# Patient Record
Sex: FEMALE | Race: WHITE | NOT HISPANIC OR LATINO | Employment: OTHER | URBAN - METROPOLITAN AREA
[De-identification: names, ages, dates, MRNs, and addresses within clinical notes are randomized per-mention and may not be internally consistent; named-entity substitution may affect disease eponyms.]

---

## 2017-08-29 ENCOUNTER — TRANSCRIBE ORDERS (OUTPATIENT)
Dept: ADMINISTRATIVE | Facility: HOSPITAL | Age: 57
End: 2017-08-29

## 2017-08-29 DIAGNOSIS — Z12.31 VISIT FOR SCREENING MAMMOGRAM: Primary | ICD-10-CM

## 2017-09-29 ENCOUNTER — APPOINTMENT (OUTPATIENT)
Dept: MAMMOGRAPHY | Facility: HOSPITAL | Age: 57
End: 2017-09-29
Attending: OBSTETRICS & GYNECOLOGY

## 2017-10-11 ENCOUNTER — HOSPITAL ENCOUNTER (OUTPATIENT)
Dept: MAMMOGRAPHY | Facility: HOSPITAL | Age: 57
Discharge: HOME | End: 2017-10-11
Attending: OBSTETRICS & GYNECOLOGY | Admitting: OBSTETRICS & GYNECOLOGY

## 2017-10-11 DIAGNOSIS — Z12.31 VISIT FOR SCREENING MAMMOGRAM: ICD-10-CM

## 2017-10-11 PROCEDURE — G0202 SCR MAMMO BI INCL CAD: HCPCS

## 2017-10-11 PROCEDURE — 77063 BREAST TOMOSYNTHESIS BI: CPT

## 2017-10-12 PROCEDURE — 77067 SCR MAMMO BI INCL CAD: CPT | Performed by: RADIOLOGY

## 2017-10-12 PROCEDURE — 77063 BREAST TOMOSYNTHESIS BI: CPT | Performed by: RADIOLOGY

## 2018-09-04 ENCOUNTER — TRANSCRIBE ORDERS (OUTPATIENT)
Dept: ADMINISTRATIVE | Facility: HOSPITAL | Age: 58
End: 2018-09-04

## 2018-09-04 DIAGNOSIS — Z12.31 VISIT FOR SCREENING MAMMOGRAM: Primary | ICD-10-CM

## 2018-10-12 ENCOUNTER — HOSPITAL ENCOUNTER (OUTPATIENT)
Dept: MAMMOGRAPHY | Facility: HOSPITAL | Age: 58
Discharge: HOME | End: 2018-10-12
Attending: OBSTETRICS & GYNECOLOGY | Admitting: OBSTETRICS & GYNECOLOGY

## 2018-10-12 DIAGNOSIS — Z12.31 VISIT FOR SCREENING MAMMOGRAM: ICD-10-CM

## 2018-10-12 PROCEDURE — 77063 BREAST TOMOSYNTHESIS BI: CPT

## 2018-10-12 PROCEDURE — 77063 BREAST TOMOSYNTHESIS BI: CPT | Performed by: RADIOLOGY

## 2018-10-12 PROCEDURE — 77067 SCR MAMMO BI INCL CAD: CPT | Performed by: RADIOLOGY

## 2018-10-12 PROCEDURE — 77067 SCR MAMMO BI INCL CAD: CPT

## 2019-09-04 ENCOUNTER — TRANSCRIBE ORDERS (OUTPATIENT)
Dept: ADMINISTRATIVE | Facility: HOSPITAL | Age: 59
End: 2019-09-04

## 2019-09-04 DIAGNOSIS — Z12.31 VISIT FOR SCREENING MAMMOGRAM: Primary | ICD-10-CM

## 2019-11-06 ENCOUNTER — HOSPITAL ENCOUNTER (OUTPATIENT)
Dept: MAMMOGRAPHY | Facility: HOSPITAL | Age: 59
Discharge: HOME | End: 2019-11-06
Admitting: OBSTETRICS & GYNECOLOGY

## 2019-11-06 DIAGNOSIS — Z12.31 VISIT FOR SCREENING MAMMOGRAM: ICD-10-CM

## 2019-11-06 PROCEDURE — 77063 BREAST TOMOSYNTHESIS BI: CPT

## 2019-11-06 PROCEDURE — 77067 SCR MAMMO BI INCL CAD: CPT | Performed by: RADIOLOGY

## 2019-11-06 PROCEDURE — 77067 SCR MAMMO BI INCL CAD: CPT

## 2019-11-06 PROCEDURE — 77063 BREAST TOMOSYNTHESIS BI: CPT | Performed by: RADIOLOGY

## 2020-09-25 ENCOUNTER — TRANSCRIBE ORDERS (OUTPATIENT)
Dept: OBSTETRICS AND GYNECOLOGY | Facility: CLINIC | Age: 60
End: 2020-09-25

## 2020-09-25 DIAGNOSIS — Z12.31 VISIT FOR SCREENING MAMMOGRAM: Primary | ICD-10-CM

## 2020-11-18 RX ORDER — CITALOPRAM 20 MG/1
TABLET ORAL
Qty: 30 TABLET | Refills: 0 | Status: SHIPPED | OUTPATIENT
Start: 2020-11-18 | End: 2020-12-11 | Stop reason: SDUPTHER

## 2020-12-11 ENCOUNTER — OFFICE VISIT (OUTPATIENT)
Dept: OBSTETRICS AND GYNECOLOGY | Facility: CLINIC | Age: 60
End: 2020-12-11

## 2020-12-11 VITALS
SYSTOLIC BLOOD PRESSURE: 150 MMHG | BODY MASS INDEX: 33.27 KG/M2 | DIASTOLIC BLOOD PRESSURE: 94 MMHG | HEIGHT: 66 IN | WEIGHT: 207 LBS

## 2020-12-11 DIAGNOSIS — Z01.419 WOMEN'S ANNUAL ROUTINE GYNECOLOGICAL EXAMINATION: Primary | ICD-10-CM

## 2020-12-11 PROBLEM — Z00.00 ANNUAL PHYSICAL EXAM: Status: ACTIVE | Noted: 2020-12-11

## 2020-12-11 PROBLEM — N95.1 MENOPAUSAL SYMPTOMS: Status: ACTIVE | Noted: 2020-12-11

## 2020-12-11 PROCEDURE — 99396 PREV VISIT EST AGE 40-64: CPT | Performed by: OBSTETRICS & GYNECOLOGY

## 2020-12-11 RX ORDER — HYDROCORTISONE ACETATE PRAMOXINE HCL 2.5; 1 G/100G; G/100G
CREAM TOPICAL 3 TIMES DAILY
Qty: 30 G | Refills: 2 | Status: SHIPPED | OUTPATIENT
Start: 2020-12-11 | End: 2023-01-03

## 2020-12-11 RX ORDER — CITALOPRAM 20 MG/1
20 TABLET ORAL DAILY
Qty: 30 TABLET | Refills: 11 | Status: SHIPPED | OUTPATIENT
Start: 2020-12-11 | End: 2021-12-03

## 2020-12-11 RX ORDER — FOLIC ACID 1 MG/1
TABLET ORAL
COMMUNITY
Start: 2020-12-07

## 2020-12-11 RX ORDER — HYDROXYCHLOROQUINE SULFATE 200 MG/1
TABLET, FILM COATED ORAL
COMMUNITY
Start: 2020-12-07 | End: 2023-01-03

## 2020-12-11 RX ORDER — METHOTREXATE 2.5 MG/1
2.5 TABLET ORAL WEEKLY
COMMUNITY

## 2020-12-11 NOTE — PROGRESS NOTES
GYN Annual Exam     CC - Here for annual exam.     Subjective   HPI  Carrie Wilson is a 60 y.o. female, , who presents for annual well woman exam.  She is postmenopausal. Her last LMP was No LMP recorded. Patient is postmenopausal..  Periods are absent because she is postmenopausal.  Patient reports problems with: none.  Partner Status: Marital Status: .    Desires STD Screening: YES/NO/Other: no.    Additional OB/GYN History   Current contraception: none, pt is postmenopausal    Last Pap :   Last Completed Pap Smear       Status Date      PAP SMEAR Done 2019 Negative        History of abnormal Pap smear: no  Family history of uterine, colon, breast, or ovarian cancer: no  Last mammogram:   Last Completed Mammogram       Status Date      MAMMOGRAM Done 2019 MAMMO SCREENING DIGITAL TOMOSYNTHESIS BILATERAL W CAD     Patient has more history with this topic...        Last colonoscopy:   Last Completed Colonoscopy       Status Date      COLONOSCOPY No completions recorded        Last DEXA: ; Normal  Exercises Regularly: no  Feelings of Anxiety or Depression: no    Tobacco Usage?: No    Health Maintenance   Topic Date Due   • COLONOSCOPY  1960   • ANNUAL PHYSICAL  08/15/1963   • TDAP/TD VACCINES (1 - Tdap) 08/15/1979   • ZOSTER VACCINE (1 of 2) 08/15/2010   • INFLUENZA VACCINE  2020   • HEPATITIS C SCREENING  10/23/2020   • Annual Gynecologic Pelvic and Breast Exam  2020   • MAMMOGRAM  2021   • PAP SMEAR  2022   • Pneumococcal Vaccine 0-64  Aged Out       Current Outpatient Medications   Medication Sig Dispense Refill   • methotrexate 2.5 MG tablet Take 2.5 mg by mouth 1 (One) Time Per Week. 4 tablets weekly     • citalopram (CeleXA) 20 MG tablet Take 1 tablet by mouth Daily. 30 tablet 11   • folic acid (FOLVITE) 1 MG tablet      • Hydrocort-Pramoxine, Perianal, (ANALPRAM-HC) 2.5-1 % rectal cream Insert  into the rectum 3 (Three) Times a Day. 30 g 2   •  "hydroxychloroquine (PLAQUENIL) 200 MG tablet        No current facility-administered medications for this visit.        The additional following portions of the patient's history were reviewed and updated as appropriate: allergies, current medications, past family history, past medical history, past social history, past surgical history and problem list.    Review of Systems   Constitutional: Negative.    HENT: Negative.    Eyes: Negative.    Respiratory: Negative.    Cardiovascular: Negative.    Gastrointestinal: Negative.    Endocrine: Negative.    Genitourinary: Negative.    Musculoskeletal: Negative.    Skin: Negative.    Allergic/Immunologic: Negative.    Neurological: Negative.    Hematological: Negative.    Psychiatric/Behavioral: Negative.        I have reviewed and agree with the HPI, ROS, and historical information as entered above. Ray Samson MD    Objective   /94   Ht 167.6 cm (66\")   Wt 93.9 kg (207 lb)   Breastfeeding No   BMI 33.41 kg/m²     PE    Breast: Without masses ,nontender, no skin changes or retractions  Axilla: Normal, no lymphadenopathy  Heart: Regular rate no murmurs rubs or gallops  Lungs: Clear to auscultation, normal breath sounds bilaterally  Abdomen: Soft nontender, no hepatosplenomegaly, no guarding or rebound, no masses  Pelvic exam  External genitalia: Normal introitus and vulva  Vagina: Normal mucosa no bleeding inflammation or discharge  Bladder: Normal position nontender  Urethral meatus and urethra: Normal nontender  Cervix: No lesions, no discharge, bleeding or inflammation  Bimanual: Nontender adnexa clear, no sign of uterine or ovarian enlargement  Anal:  external hemorrhoid noted    Rectovaginal: Patient declined      Assessment/Plan     Assessment     Problem List Items Addressed This Visit     None      Visit Diagnoses     Women's annual routine gynecological examination    -  Primary    Relevant Orders    Pap IG, Rfx HPV ASCU          1. GYN annual " well woman exam.   2. Patient would like to continue to get yearly Paps  3. Renew her Celexa  4. Prescribe primary seen hydrocortisone cream for hemorrhoid    Plan     1. Reviewed HPV guidelines and she would like to continue yearly paps regardless.   2. Reviewed monthly self breast exams.  Instructed to call with lumps, pain, or breast discharge.  Yearly mammograms ordered.  3. Anticipatory menopausal guidance given.  Healthy lifestyle changes including diet and exercise reviewed, preventative measures for health reviewed.  Ray Samson MD  12/11/2020

## 2020-12-17 DIAGNOSIS — Z01.419 WOMEN'S ANNUAL ROUTINE GYNECOLOGICAL EXAMINATION: ICD-10-CM

## 2020-12-29 ENCOUNTER — APPOINTMENT (OUTPATIENT)
Dept: MAMMOGRAPHY | Facility: HOSPITAL | Age: 60
End: 2020-12-29

## 2021-01-27 ENCOUNTER — TELEPHONE (OUTPATIENT)
Dept: OBSTETRICS AND GYNECOLOGY | Facility: CLINIC | Age: 61
End: 2021-01-27

## 2021-01-27 NOTE — TELEPHONE ENCOUNTER
Pharmacy calling for clarification on Rx. Written cream not covered. Pharmacy wanting to know if they can use alternate.

## 2021-03-09 ENCOUNTER — HOSPITAL ENCOUNTER (OUTPATIENT)
Dept: MAMMOGRAPHY | Facility: HOSPITAL | Age: 61
End: 2021-03-09

## 2021-04-26 ENCOUNTER — HOSPITAL ENCOUNTER (OUTPATIENT)
Dept: MAMMOGRAPHY | Facility: HOSPITAL | Age: 61
Discharge: HOME | End: 2021-04-26
Admitting: OBSTETRICS & GYNECOLOGY

## 2021-04-26 DIAGNOSIS — Z12.31 VISIT FOR SCREENING MAMMOGRAM: ICD-10-CM

## 2021-04-26 PROCEDURE — 77067 SCR MAMMO BI INCL CAD: CPT

## 2021-04-26 PROCEDURE — 77063 BREAST TOMOSYNTHESIS BI: CPT

## 2021-04-26 PROCEDURE — 77067 SCR MAMMO BI INCL CAD: CPT | Performed by: RADIOLOGY

## 2021-04-26 PROCEDURE — 77063 BREAST TOMOSYNTHESIS BI: CPT | Performed by: RADIOLOGY

## 2021-12-03 RX ORDER — CITALOPRAM 20 MG/1
TABLET ORAL
Qty: 30 TABLET | Refills: 0 | Status: SHIPPED | OUTPATIENT
Start: 2021-12-03 | End: 2021-12-22 | Stop reason: SDUPTHER

## 2021-12-22 ENCOUNTER — OFFICE VISIT (OUTPATIENT)
Dept: OBSTETRICS AND GYNECOLOGY | Facility: CLINIC | Age: 61
End: 2021-12-22

## 2021-12-22 VITALS
BODY MASS INDEX: 29.41 KG/M2 | SYSTOLIC BLOOD PRESSURE: 120 MMHG | HEIGHT: 66 IN | DIASTOLIC BLOOD PRESSURE: 72 MMHG | WEIGHT: 183 LBS

## 2021-12-22 DIAGNOSIS — N95.1 MENOPAUSAL SYMPTOMS: ICD-10-CM

## 2021-12-22 DIAGNOSIS — Z01.419 PAP TEST, AS PART OF ROUTINE GYNECOLOGICAL EXAMINATION: Primary | ICD-10-CM

## 2021-12-22 PROCEDURE — 99396 PREV VISIT EST AGE 40-64: CPT | Performed by: OBSTETRICS & GYNECOLOGY

## 2021-12-22 RX ORDER — HYDROXYCHLOROQUINE SULFATE 200 MG/1
TABLET, FILM COATED ORAL 2 TIMES DAILY
COMMUNITY

## 2021-12-22 RX ORDER — CALCIUM ACETATE 667 MG/1
TABLET ORAL
COMMUNITY

## 2021-12-22 RX ORDER — CITALOPRAM 20 MG/1
20 TABLET ORAL DAILY
Qty: 30 TABLET | Refills: 11 | Status: SHIPPED | OUTPATIENT
Start: 2021-12-22 | End: 2023-01-03 | Stop reason: SDUPTHER

## 2021-12-22 NOTE — PROGRESS NOTES
GYN Annual Exam     CC - Here for annual exam.   Pt wants yearly paps  Pap last year Pap HPV if ASCUS     Subjective   HPI  Carrie Wilson is a 61 y.o. female, , who presents for annual well woman exam.  She is postmenopausal.  No LMP recorded. Patient is postmenopausal.  Patient reports problems with: none.  Partner Status: Marital Status: .    Desires STD Screening:  no.    Additional OB/GYN History   Current contraception: menopausal     Last Pap :   Last Completed Pap Smear          Ordered - PAP SMEAR (Every 3 Years) Ordered on 2021  Pap IG, Rfx HPV ASCU    2019  Done - Negative              History of abnormal Pap smear: no  Family history of uterine, colon, breast, or ovarian cancer: no  Last mammogram:   Last Completed Mammogram          MAMMOGRAM (Yearly) Next due on 2021  Mammo Screening Digital Tomosynthesis Bilateral With CAD    2019  Mammo Screening Digital Tomosynthesis Bilateral With CAD    10/12/2018  Mammo Screening Digital Tomosynthesis Bilateral With CAD    10/11/2017  Mammo Screening Digital Tomosynthesis Bilateral With CAD    2016  Mammo Screening Digital Tomosynthesis Bilateral With CAD    Only the first 5 history entries have been loaded, but more history exists.              Last colonoscopy: Dr. High  normal - pt has IBS D  Last Completed Colonoscopy     This patient has no relevant Health Maintenance data.        Last DEXA:  normal with PCP hx osteopenia off meds  Exercises Regularly: yes  Feelings of Anxiety or Depression: no    Tobacco Usage?: no     Health Maintenance   Topic Date Due   • COLORECTAL CANCER SCREENING  Never done   • ANNUAL PHYSICAL  Never done   • TDAP/TD VACCINES (1 - Tdap) Never done   • ZOSTER VACCINE (1 of 2) Never done   • HEPATITIS C SCREENING  Never done   • COVID-19 Vaccine (2 - Booster for Billy series) 2021   • Annual Gynecologic Pelvic and Breast Exam  2021   •  "MAMMOGRAM  04/26/2022   • PAP SMEAR  12/11/2023   • INFLUENZA VACCINE  Completed   • Pneumococcal Vaccine 0-64  Aged Out       Current Outpatient Medications   Medication Sig Dispense Refill   • Calcium Acetate 667 MG tablet      • citalopram (CeleXA) 20 MG tablet Take 1 tablet by mouth Daily. 30 tablet 11   • folic acid (FOLVITE) 1 MG tablet      • Hydrocort-Pramoxine, Perianal, (ANALPRAM-HC) 2.5-1 % rectal cream Insert  into the rectum 3 (Three) Times a Day. 30 g 2   • hydroxychloroquine (PLAQUENIL) 200 MG tablet      • methotrexate 2.5 MG tablet Take 2.5 mg by mouth 1 (One) Time Per Week. 4 tablets weekly     • hydroxychloroquine (PLAQUENIL) 200 MG tablet Daily.     • methotrexate (TREXALL) 5 MG tablet 1 tab(s)       No current facility-administered medications for this visit.       The additional following portions of the patient's history were reviewed and updated as appropriate: allergies, past family history, past medical history, past social history, past surgical history and problem list.    Review of Systems   Constitutional: Negative.    HENT: Negative.    Eyes: Negative.    Respiratory: Negative.    Cardiovascular: Negative.    Gastrointestinal: Positive for diarrhea (pt has IBS D).   Endocrine: Negative.    Genitourinary: Negative.    Musculoskeletal: Negative.    Skin: Negative.    Allergic/Immunologic: Negative.    Neurological: Negative.    Hematological: Negative.    Psychiatric/Behavioral: Negative.        I have reviewed and agree with the HPI, ROS, and historical information as entered above. Ray Samson MD    Objective   /72   Ht 167.6 cm (66\")   Wt 83 kg (183 lb)   BMI 29.54 kg/m²     PE    Breast: Without masses ,nontender, no skin changes or retractions  Axilla: Normal, no lymphadenopathy  Heart: Regular rate no murmurs rubs or gallops  Lungs: Clear to auscultation, normal breath sounds bilaterally  Abdomen: Soft nontender, no hepatosplenomegaly, no guarding or rebound, no " masses  Pelvic exam  External genitalia: Normal introitus and vulva  Vagina: Normal mucosa no bleeding inflammation or discharge  Bladder: Normal position nontender  Urethral meatus and urethra: Normal nontender  Cervix: No lesions, no discharge, bleeding or inflammation  Bimanual: Nontender adnexa clear, no sign of uterine or ovarian enlargement      Assessment/Plan     Assessment     Problem List Items Addressed This Visit        Genitourinary and Reproductive     Menopausal symptoms    Pap test, as part of routine gynecological examination - Primary    Relevant Orders    Pap IG, Rfx HPV ASCU          1. GYN annual well woman exam.   2. Normal exam    Plan     1. Next pap due in: 3 years; patient wants to continue yearly  2. Reviewed HPV guidelines and she would like to continue yearly paps regardless.   3. Anticipatory menopausal guidance given.  Preventative health information reviewed  Ray Samson MD  12/22/2021

## 2022-01-04 DIAGNOSIS — Z01.419 PAP TEST, AS PART OF ROUTINE GYNECOLOGICAL EXAMINATION: ICD-10-CM

## 2022-03-22 ENCOUNTER — TRANSCRIBE ORDERS (OUTPATIENT)
Dept: ADMINISTRATIVE | Facility: HOSPITAL | Age: 62
End: 2022-03-22

## 2022-03-22 DIAGNOSIS — Z12.31 VISIT FOR SCREENING MAMMOGRAM: Primary | ICD-10-CM

## 2022-05-09 ENCOUNTER — APPOINTMENT (OUTPATIENT)
Dept: MAMMOGRAPHY | Facility: HOSPITAL | Age: 62
End: 2022-05-09

## 2022-06-13 ENCOUNTER — HOSPITAL ENCOUNTER (OUTPATIENT)
Dept: MAMMOGRAPHY | Facility: HOSPITAL | Age: 62
Discharge: HOME | End: 2022-06-13
Admitting: NURSE PRACTITIONER

## 2022-06-13 DIAGNOSIS — Z12.31 VISIT FOR SCREENING MAMMOGRAM: ICD-10-CM

## 2022-06-13 PROCEDURE — 77063 BREAST TOMOSYNTHESIS BI: CPT

## 2022-06-13 PROCEDURE — 77063 BREAST TOMOSYNTHESIS BI: CPT | Performed by: RADIOLOGY

## 2022-06-13 PROCEDURE — 77067 SCR MAMMO BI INCL CAD: CPT | Performed by: RADIOLOGY

## 2022-06-13 PROCEDURE — 77067 SCR MAMMO BI INCL CAD: CPT

## 2023-01-03 ENCOUNTER — OFFICE VISIT (OUTPATIENT)
Dept: OBSTETRICS AND GYNECOLOGY | Facility: CLINIC | Age: 63
End: 2023-01-03
Payer: COMMERCIAL

## 2023-01-03 VITALS
SYSTOLIC BLOOD PRESSURE: 112 MMHG | HEIGHT: 66 IN | WEIGHT: 194 LBS | BODY MASS INDEX: 31.18 KG/M2 | DIASTOLIC BLOOD PRESSURE: 82 MMHG

## 2023-01-03 DIAGNOSIS — Z78.0 POSTMENOPAUSAL STATUS: ICD-10-CM

## 2023-01-03 DIAGNOSIS — Z12.39 ENCOUNTER FOR BREAST CANCER SCREENING USING NON-MAMMOGRAM MODALITY: ICD-10-CM

## 2023-01-03 DIAGNOSIS — M85.80 OSTEOPENIA AFTER MENOPAUSE: ICD-10-CM

## 2023-01-03 DIAGNOSIS — Z78.0 OSTEOPENIA AFTER MENOPAUSE: ICD-10-CM

## 2023-01-03 DIAGNOSIS — N95.1 MENOPAUSAL SYMPTOMS: ICD-10-CM

## 2023-01-03 DIAGNOSIS — Z01.419 WOMEN'S ANNUAL ROUTINE GYNECOLOGICAL EXAMINATION: Primary | ICD-10-CM

## 2023-01-03 PROCEDURE — 99396 PREV VISIT EST AGE 40-64: CPT | Performed by: OBSTETRICS & GYNECOLOGY

## 2023-01-03 RX ORDER — FINASTERIDE 5 MG/1
TABLET, FILM COATED ORAL
COMMUNITY
Start: 2022-11-21

## 2023-01-03 RX ORDER — FLUTICASONE PROPIONATE 50 MCG
SPRAY, SUSPENSION (ML) NASAL EVERY 24 HOURS
COMMUNITY

## 2023-01-03 RX ORDER — CITALOPRAM 20 MG/1
20 TABLET ORAL DAILY
Qty: 90 TABLET | Refills: 3 | Status: SHIPPED | OUTPATIENT
Start: 2023-01-03

## 2023-01-03 RX ORDER — MELATONIN
1000 DAILY
COMMUNITY

## 2023-01-03 RX ORDER — MINOXIDIL 2.5 MG/1
TABLET ORAL
COMMUNITY
Start: 2022-11-21

## 2023-01-03 NOTE — PROGRESS NOTES
Gynecologic Annual Exam Note        GYN Annual Exam     CC - Here for annual exam.        DELIA  Carrie Wilson is a 62 y.o. female, , who presents for annual well woman exam as a established patient of the practice who is new to me. She is postmenopausal. Denies vaginal bleeding. Patient reports problems with: urinary frequency. Patient states that this has been present since menopause. Patient reports drinking large amounts of water daily. There were no changes to her medical or surgical history since her last visit. Partner Status: Marital Status: .  She is is not currently sexually active. STD testing recommendations have been explained to the patient and she does not desire STD testing.    She usually gets up zero to one time at time at night.    Additional OB/GYN History   On HRT? No    Last Pap : 2021. Results: negative. HPV: not done  Last Completed Pap Smear          Ordered - PAP SMEAR (Every 3 Years) Ordered on 1/3/2023    2021  SCANNED - PAP SMEAR    2020  Pap IG, Rfx HPV ASCU    2019  Done - Negative              History of abnormal Pap smear: no  Family history of uterine, colon, breast, or ovarian cancer: no  Performs monthly Self-Breast Exam: no  Last mammogram: 2022. Done at .  Last Completed Mammogram          Ordered - MAMMOGRAM (Yearly) Ordered on 1/3/2023    2022  Mammo Screening Digital Tomosynthesis Bilateral With CAD    2021  Mammo Screening Digital Tomosynthesis Bilateral With CAD    2019  Mammo Screening Digital Tomosynthesis Bilateral With CAD    10/12/2018  Mammo Screening Digital Tomosynthesis Bilateral With CAD    10/11/2017  Mammo Screening Digital Tomosynthesis Bilateral With CAD    Only the first 5 history entries have been loaded, but more history exists.              Last colonoscopy: has had a colonoscopy 4 year(s) ago.  Last Completed Colonoscopy     This patient has no relevant Health Maintenance data.             Last bone density scan (DEXA): On 2029 and results were Osteopenia.   Exercises Regularly: yes  Feelings of Anxiety or Depression: no      Tobacco Usage?: No       Current Outpatient Medications:   •  Calcium Acetate 667 MG tablet, , Disp: , Rfl:   •  cholecalciferol (VITAMIN D3) 25 MCG (1000 UT) tablet, Take 1,000 Units by mouth Daily., Disp: , Rfl:   •  citalopram (CeleXA) 20 MG tablet, Take 1 tablet by mouth Daily., Disp: 90 tablet, Rfl: 3  •  finasteride (PROSCAR) 5 MG tablet, , Disp: , Rfl:   •  fluticasone (FLONASE) 50 MCG/ACT nasal spray, Daily., Disp: , Rfl:   •  folic acid (FOLVITE) 1 MG tablet, , Disp: , Rfl:   •  hydroxychloroquine (PLAQUENIL) 200 MG tablet, 2 (Two) Times a Day., Disp: , Rfl:   •  methotrexate 2.5 MG tablet, Take 2.5 mg by mouth 1 (One) Time Per Week. 3 tablets weekly, Disp: , Rfl:   •  minoxidil (LONITEN) 2.5 MG tablet, , Disp: , Rfl:   •  tretinoin (RETIN-A) 0.025 % cream, , Disp: , Rfl:     Patient is requesting refills of Celexa.    OB History        2    Para   2    Term   2            AB        Living   2       SAB        IAB        Ectopic        Molar        Multiple        Live Births   2                Past Medical History:   Diagnosis Date   • History of bone density study 02/10/2017    2019-enia--on fosamax   • Hx of colonoscopy 2017    normal--May pt had diarrhea--tests normal    • IBS (irritable bowel syndrome)    • Osteopenia    • Papanicolaou smear 10/16/2018    2019-neg   • Screening breast examination     Self; Admits   • Varicella child        Past Surgical History:   Procedure Laterality Date   • COLONOSCOPY  2017-neg   • CYST REMOVAL      from chest   • ENDOMETRIAL BIOPSY  2011    benign   • TOE SURGERY     • WISDOM TOOTH EXTRACTION         Health Maintenance   Topic Date Due   • DXA SCAN  Never done   • COLORECTAL CANCER SCREENING  Never done   • TDAP/TD VACCINES (1 - Tdap) Never done   •  HEPATITIS C SCREENING  Never done   • ANNUAL PHYSICAL  Never done   • INFLUENZA VACCINE  08/01/2022   • Annual Gynecologic Pelvic and Breast Exam  12/23/2022   • ZOSTER VACCINE (2 of 2) 01/02/2023   • MAMMOGRAM  06/13/2023   • PAP SMEAR  12/22/2024   • COVID-19 Vaccine  Completed   • Pneumococcal Vaccine 0-64  Aged Out       The additional following portions of the patient's history were reviewed and updated as appropriate: allergies, current medications, past family history, past medical history, past social history, past surgical history and problem list.    Review of Systems   Constitutional: Negative.    HENT: Negative.    Eyes: Negative.    Respiratory: Negative.    Cardiovascular: Negative.    Gastrointestinal: Negative.    Endocrine: Negative.    Genitourinary: Positive for frequency.   Musculoskeletal: Negative.    Skin: Negative.    Allergic/Immunologic: Negative.    Neurological: Negative.    Hematological: Negative.    Psychiatric/Behavioral: Negative.        I have reviewed and agree with the HPI, ROS, and historical information as entered above. Yudelka Roman MD    Objective   /82   Ht 167.6 cm (66\")   Wt 88 kg (194 lb)   BMI 31.31 kg/m²     Physical Exam  Vitals and nursing note reviewed. Exam conducted with a chaperone present.   Constitutional:       Appearance: She is well-developed.   HENT:      Head: Normocephalic and atraumatic.   Neck:      Thyroid: No thyroid mass or thyromegaly.   Cardiovascular:      Rate and Rhythm: Normal rate and regular rhythm.      Heart sounds: No murmur heard.  Pulmonary:      Effort: Pulmonary effort is normal. No retractions.      Breath sounds: Normal breath sounds. No wheezing, rhonchi or rales.   Chest:      Chest wall: No mass or tenderness.   Breasts:     Right: Normal. No mass, nipple discharge, skin change or tenderness.      Left: Normal. No mass, nipple discharge, skin change or tenderness.   Abdominal:      General: Bowel sounds are normal.       Palpations: Abdomen is soft. Abdomen is not rigid. There is no mass.      Tenderness: There is no abdominal tenderness. There is no guarding.      Hernia: No hernia is present. There is no hernia in the left inguinal area.   Genitourinary:     Labia:         Right: No rash, tenderness or lesion.         Left: No rash, tenderness or lesion.       Vagina: Normal. No vaginal discharge or lesions.      Cervix: No cervical motion tenderness, discharge, lesion or cervical bleeding.      Uterus: Normal. Not enlarged, not fixed and not tender.       Adnexa:         Right: No mass or tenderness.          Left: No mass or tenderness.        Rectum: No external hemorrhoid.   Musculoskeletal:      Cervical back: Normal range of motion. No muscular tenderness.   Neurological:      Mental Status: She is alert and oriented to person, place, and time.   Psychiatric:         Behavior: Behavior normal.            Assessment and Plan    Problem List Items Addressed This Visit        Genitourinary and Reproductive     Menopausal symptoms    Overview     Controlled with Celexa            Musculoskeletal and Injuries    Osteopenia after menopause    Overview     Followed by Dr. Christiane Hernandez, Rhuematologist        Other Visit Diagnoses     Women's annual routine gynecological examination    -  Primary    Relevant Orders    LIQUID-BASED PAP SMEAR, [facility] ([facility])    Encounter for breast cancer screening using non-mammogram modality        Relevant Orders    Mammo Screening Digital Tomosynthesis Bilateral With CAD    Postmenopausal status              1. GYN annual well woman exam.   2. Recommended use of Vitamin D replacement and getting adequate calcium in her diet. (1500mg)  3. Reviewed monthly self breast exams.  Instructed to call with lumps, pain, or breast discharge.    4. Continue yearly mammography  5. Reviewed HPV guidelines.  6. Reviewed exercise as a preventative health measures.   7. She participates in the [employer] [facility]  cancer research program  8. Urinary frequency - no intervention needed at this time.  9. Return in about 1 year (around 1/3/2024).     Yudelka Roman MD  01/03/2023

## 2023-01-05 LAB — REF LAB TEST METHOD: NORMAL

## 2023-06-14 ENCOUNTER — HOSPITAL ENCOUNTER (OUTPATIENT)
Dept: MAMMOGRAPHY | Facility: HOSPITAL | Age: 63
Discharge: HOME | End: 2023-06-14
Admitting: OBSTETRICS & GYNECOLOGY
Payer: COMMERCIAL

## 2023-06-14 DIAGNOSIS — Z12.39 ENCOUNTER FOR BREAST CANCER SCREENING USING NON-MAMMOGRAM MODALITY: ICD-10-CM

## 2023-06-14 PROCEDURE — 77067 SCR MAMMO BI INCL CAD: CPT

## 2023-06-14 PROCEDURE — 77063 BREAST TOMOSYNTHESIS BI: CPT

## 2023-12-26 RX ORDER — CITALOPRAM 20 MG/1
20 TABLET ORAL DAILY
Qty: 90 TABLET | Refills: 0 | Status: SHIPPED | OUTPATIENT
Start: 2023-12-26

## 2024-03-22 ENCOUNTER — OFFICE VISIT (OUTPATIENT)
Dept: OBSTETRICS AND GYNECOLOGY | Facility: CLINIC | Age: 64
End: 2024-03-22
Payer: COMMERCIAL

## 2024-03-22 VITALS
SYSTOLIC BLOOD PRESSURE: 138 MMHG | DIASTOLIC BLOOD PRESSURE: 86 MMHG | HEIGHT: 66 IN | WEIGHT: 201 LBS | BODY MASS INDEX: 32.3 KG/M2

## 2024-03-22 DIAGNOSIS — Z78.0 OSTEOPENIA AFTER MENOPAUSE: ICD-10-CM

## 2024-03-22 DIAGNOSIS — N95.1 MENOPAUSAL SYMPTOMS: ICD-10-CM

## 2024-03-22 DIAGNOSIS — Z01.419 WOMEN'S ANNUAL ROUTINE GYNECOLOGICAL EXAMINATION: Primary | ICD-10-CM

## 2024-03-22 DIAGNOSIS — Z12.31 ENCOUNTER FOR SCREENING MAMMOGRAM FOR MALIGNANT NEOPLASM OF BREAST: ICD-10-CM

## 2024-03-22 DIAGNOSIS — M85.80 OSTEOPENIA AFTER MENOPAUSE: ICD-10-CM

## 2024-03-22 RX ORDER — BETAMETHASONE DIPROPIONATE 0.05 %
OINTMENT (GRAM) TOPICAL EVERY 24 HOURS
COMMUNITY

## 2024-03-22 RX ORDER — AZELASTINE HCL 205.5 UG/1
SPRAY NASAL
COMMUNITY

## 2024-03-22 RX ORDER — CITALOPRAM 20 MG/1
20 TABLET ORAL DAILY
Qty: 90 TABLET | Refills: 4 | Status: SHIPPED | OUTPATIENT
Start: 2024-03-22

## 2024-03-22 RX ORDER — TACROLIMUS 1 MG/G
OINTMENT TOPICAL
COMMUNITY
Start: 2024-01-29

## 2024-03-22 NOTE — PROGRESS NOTES
Gynecologic Annual Exam Note        GYN Annual Exam     CC - Here for annual exam.        HPI  Carrie Wilson is a 63 y.o. female, , who presents for annual well woman exam as a established patient.  She is postmenopausal.. Denies vaginal bleeding.   There were no changes to her medical or surgical history since her last visit. Marital Status: .  She is not currently sexually active. STD testing recommendations have been explained to the patient and she declines STD testing.    The patient would like to discuss the following complaints today: none    Additional OB/GYN History   On HRT? No    Last Pap : 1/3/2023. Results: negative. HPV: negative.   Last Completed Pap Smear            PAP SMEAR (Every 3 Years) Next due on 1/3/2026      2023  LIQUID-BASED PAP SMEAR, [facility] ([facility])    2021  SCANNED - PAP SMEAR    2020  Pap IG, Rfx HPV ASCU    2019  Done - Negative                  History of abnormal Pap smear: no  Family history of uterine, colon, breast, or ovarian cancer: no  Performs monthly Self-Breast Exam: no  Last mammogram: 2023. Done at .    Last Completed Mammogram       This patient has no relevant Health Maintenance data.          Last colonoscopy: has had a colonoscopy 5 years ago    Last Completed Colonoscopy       This patient has no relevant Health Maintenance data.            Her last bone density scan was 5 months ago and results were Osteopenia  Exercises Regularly: yes  Feelings of Anxiety or Depression: no      Tobacco Usage?: No       Current Outpatient Medications:     azelastine (ASTEPRO) 0.15 % solution nasal spray, spray 2 spray by intranasal route 2 times every day in each nostril, Disp: , Rfl:     betamethasone dipropionate (DIPROLENE) 0.05 % ointment, Apply  topically to the appropriate area as directed Daily., Disp: , Rfl:     Calcium Acetate 667 MG tablet, , Disp: , Rfl:     cholecalciferol (VITAMIN D3) 25 MCG (1000 UT) tablet,  Take 1 tablet by mouth Daily., Disp: , Rfl:     citalopram (CeleXA) 20 MG tablet, Take 1 tablet by mouth Daily., Disp: 90 tablet, Rfl: 4    finasteride (PROSCAR) 5 MG tablet, , Disp: , Rfl:     folic acid (FOLVITE) 1 MG tablet, , Disp: , Rfl:     hydroxychloroquine (PLAQUENIL) 200 MG tablet, 2 (Two) Times a Day., Disp: , Rfl:     methotrexate 2.5 MG tablet, Take 1 tablet by mouth 1 (One) Time Per Week. 3 tablets weekly, Disp: , Rfl:     minoxidil (LONITEN) 2.5 MG tablet, , Disp: , Rfl:     tacrolimus (PROTOPIC) 0.1 % ointment, , Disp: , Rfl:     tretinoin (RETIN-A) 0.025 % cream, , Disp: , Rfl:     Patient denies the need for medication refills today.    OB History          2    Para   2    Term   2            AB        Living   2         SAB        IAB        Ectopic        Molar        Multiple        Live Births   2                Past Medical History:   Diagnosis Date    History of bone density study 02/10/2017    2019-enia--on fosamax    Hx of colonoscopy 2017    normal--May pt had diarrhea--tests normal     IBS (irritable bowel syndrome)     Osteopenia     Papanicolaou smear 10/16/2018    2019-neg    Screening breast examination     Self; Admits    Varicella child        Past Surgical History:   Procedure Laterality Date    COLONOSCOPY  2017-neg    CYST REMOVAL      from chest    ENDOMETRIAL BIOPSY  2011    benign    TOE SURGERY      WISDOM TOOTH EXTRACTION         Health Maintenance   Topic Date Due    BMI FOLLOWUP  Never done    COLORECTAL CANCER SCREENING  Never done    TDAP/TD VACCINES (1 - Tdap) Never done    RSV Vaccine - Adults (1 - 1-dose 60+ series) Never done    HEPATITIS C SCREENING  Never done    ANNUAL PHYSICAL  Never done    Annual Gynecologic Pelvic and Breast Exam  2024    MAMMOGRAM  2024    DXA SCAN  10/06/2025    PAP SMEAR  2026    COVID-19 Vaccine  Completed    INFLUENZA VACCINE  Completed    ZOSTER VACCINE   "Completed    Pneumococcal Vaccine 0-64  Aged Out       The additional following portions of the patient's history were reviewed and updated as appropriate: allergies, current medications, past family history, past medical history, past social history, past surgical history, and problem list.    Review of Systems   Respiratory: Negative.  Negative for shortness of breath.    Cardiovascular: Negative.  Negative for chest pain.   Gastrointestinal: Negative.  Negative for abdominal distention, abdominal pain and constipation.   Endocrine: Positive for heat intolerance.        Managed with Celexa   Genitourinary: Negative.  Negative for breast discharge, breast lump, breast pain, dysuria, pelvic pain, pelvic pressure, urinary incontinence, vaginal bleeding, vaginal discharge and vaginal pain.   Psychiatric/Behavioral: Negative.  Negative for depressed mood. The patient is not nervous/anxious.        I have reviewed and agree with the HPI, ROS, and historical information as entered above. Hermelinda Romero, APRN      Objective   /86   Ht 167.6 cm (66\")   Wt 91.2 kg (201 lb)   BMI 32.44 kg/m²     Physical Exam  Vitals and nursing note reviewed. Exam conducted with a chaperone present.   Constitutional:       Appearance: Normal appearance. She is well-developed. She is not ill-appearing.   HENT:      Head: Normocephalic and atraumatic.   Neck:      Thyroid: No thyroid mass, thyromegaly or thyroid tenderness.   Cardiovascular:      Rate and Rhythm: Normal rate and regular rhythm.      Heart sounds: Normal heart sounds. No murmur heard.  Pulmonary:      Effort: Pulmonary effort is normal. No retractions.      Breath sounds: Normal breath sounds. No wheezing, rhonchi or rales.   Chest:      Chest wall: No mass or tenderness.   Breasts:     Breasts are symmetrical.      Right: Normal. No mass, nipple discharge, skin change or tenderness.      Left: Normal. No mass, nipple discharge, skin change or tenderness. "   Abdominal:      Palpations: Abdomen is soft. Abdomen is not rigid. There is no mass.      Tenderness: There is no abdominal tenderness. There is no guarding or rebound.      Hernia: No hernia is present. There is no hernia in the left inguinal area or right inguinal area.   Genitourinary:     General: Normal vulva.      Labia:         Right: No rash, tenderness or lesion.         Left: No rash, tenderness or lesion.       Vagina: Normal. No vaginal discharge, erythema, tenderness, bleeding or lesions.      Cervix: No cervical motion tenderness, discharge, friability, lesion, erythema or cervical bleeding.      Uterus: Normal. Not enlarged, not fixed and not tender.       Adnexa: Right adnexa normal and left adnexa normal.        Right: No mass or tenderness.          Left: No mass or tenderness.        Rectum: No external hemorrhoid.   Musculoskeletal:      Cervical back: Normal range of motion. No muscular tenderness.   Lymphadenopathy:      Upper Body:      Right upper body: No supraclavicular or axillary adenopathy.      Left upper body: No supraclavicular or axillary adenopathy.   Neurological:      Mental Status: She is alert and oriented to person, place, and time.   Psychiatric:         Mood and Affect: Mood normal.         Behavior: Behavior normal.            Assessment and Plan    Problem List Items Addressed This Visit       Menopausal symptoms    Overview     Controlled with Celexa         Relevant Medications    citalopram (CeleXA) 20 MG tablet    Osteopenia after menopause    Overview     Followed by Dr. Christiane Hernandez, Rhuematologist          Other Visit Diagnoses       Women's annual routine gynecological examination    -  Primary    Encounter for screening mammogram for malignant neoplasm of breast        Relevant Orders    Mammo Screening Digital Tomosynthesis Bilateral With CAD            GYN annual well woman exam.   Pap guidelines reviewed.  No Pap.  Reviewed monthly self breast exams.  Instructed  to call with lumps, pain, or breast discharge.  Advised annual mammograms.  Ordered mammogram today.  Recommended use of Vitamin D and getting adequate calcium in her diet. (1500mg).  Healthy bone information given to patient.  Osteoporosis screening ordered today.  Patient reports rheumatologist orders.  Reviewed exercise as a preventative health measures.   Colonoscopy follow-up as recommended.  Reviewed [facility] ovarian cancer screening program.  Information given.  Recommended Flu Vaccine in Fall of each year.  Instructed on [place] exercises and gave patient educational information.  Return in about 1 year (around 3/22/2025) for Annual physical or sooner if needed.         Hermelinda Romero, APRN  03/22/2024

## 2024-06-11 RX ORDER — METHOTREXATE 2.5 MG/1
TABLET ORAL
Qty: 18 TABLET | Refills: 0 | Status: SHIPPED | OUTPATIENT
Start: 2024-06-11

## 2024-06-14 RX ORDER — METHOTREXATE 2.5 MG/1
TABLET ORAL
Qty: 18 TABLET | Refills: 0 | OUTPATIENT
Start: 2024-06-14

## 2024-06-17 RX ORDER — HYDROXYCHLOROQUINE SULFATE 200 MG/1
200 TABLET, FILM COATED ORAL 2 TIMES DAILY
Qty: 60 TABLET | Refills: 2 | Status: SHIPPED | OUTPATIENT
Start: 2024-06-17

## 2024-06-28 LAB
NCCN CRITERIA FLAG: NORMAL
TYRER CUZICK SCORE: 8.8

## 2024-07-01 ENCOUNTER — HOSPITAL ENCOUNTER (OUTPATIENT)
Dept: MAMMOGRAPHY | Facility: HOSPITAL | Age: 64
Discharge: HOME | End: 2024-07-01
Payer: COMMERCIAL

## 2024-07-01 DIAGNOSIS — Z12.31 ENCOUNTER FOR SCREENING MAMMOGRAM FOR MALIGNANT NEOPLASM OF BREAST: ICD-10-CM

## 2024-07-01 PROCEDURE — 77063 BREAST TOMOSYNTHESIS BI: CPT

## 2024-07-01 PROCEDURE — 77067 SCR MAMMO BI INCL CAD: CPT

## 2024-08-26 RX ORDER — HYDROXYCHLOROQUINE SULFATE 200 MG/1
200 TABLET, FILM COATED ORAL 2 TIMES DAILY
Qty: 60 TABLET | Refills: 0 | Status: SHIPPED | OUTPATIENT
Start: 2024-08-26

## 2024-08-26 RX ORDER — METHOTREXATE 2.5 MG/1
7.5 TABLET ORAL WEEKLY
Qty: 12 TABLET | Refills: 0 | Status: SHIPPED | OUTPATIENT
Start: 2024-08-26

## 2024-08-26 NOTE — TELEPHONE ENCOUNTER
Patient called stating her appointment with [facility] was moved from 7/23 to 9/13 so she is about to run out of methotrexate and plaquenil. Labs 6/26 and patient states she plans to get them again this week. Rx sent.

## 2024-09-05 PROBLEM — M25.50 JOINT PAIN: Status: ACTIVE | Noted: 2024-09-05

## 2024-09-13 ENCOUNTER — OFFICE VISIT (OUTPATIENT)
Age: 64
End: 2024-09-13
Payer: COMMERCIAL

## 2024-09-13 VITALS
HEIGHT: 66 IN | WEIGHT: 205.3 LBS | BODY MASS INDEX: 32.99 KG/M2 | HEART RATE: 70 BPM | SYSTOLIC BLOOD PRESSURE: 122 MMHG | TEMPERATURE: 97.2 F | DIASTOLIC BLOOD PRESSURE: 76 MMHG

## 2024-09-13 DIAGNOSIS — M05.79 RHEUMATOID ARTHRITIS INVOLVING MULTIPLE SITES WITH POSITIVE RHEUMATOID FACTOR: Primary | ICD-10-CM

## 2024-09-13 DIAGNOSIS — Z79.899 HIGH RISK MEDICATION USE: ICD-10-CM

## 2024-09-13 DIAGNOSIS — E55.9 VITAMIN D DEFICIENCY: ICD-10-CM

## 2024-09-13 RX ORDER — HYDROXYCHLOROQUINE SULFATE 200 MG/1
200 TABLET, FILM COATED ORAL 2 TIMES DAILY
Qty: 60 TABLET | Refills: 4 | Status: SHIPPED | OUTPATIENT
Start: 2024-09-13

## 2024-09-13 RX ORDER — FOLIC ACID 1 MG/1
1 TABLET ORAL DAILY
Qty: 90 TABLET | Refills: 1 | Status: SHIPPED | OUTPATIENT
Start: 2024-09-13 | End: 2025-02-26 | Stop reason: SDUPTHER

## 2024-09-13 RX ORDER — CHOLECALCIFEROL (VITAMIN D3) 25 MCG
4000 TABLET ORAL DAILY
Status: SHIPPED | COMMUNITY
Start: 2024-09-13

## 2024-09-13 RX ORDER — METHOTREXATE 2.5 MG/1
7.5 TABLET ORAL WEEKLY
Qty: 12 TABLET | Refills: 4 | Status: SHIPPED | OUTPATIENT
Start: 2024-09-13 | End: 2025-02-06

## 2024-09-13 NOTE — PROGRESS NOTES
[facility] Office Follow Up Visit     Office Follow Up      Date: 09/13/2024   Patient Name: Carrie Wilson  MRN: [id number]  YOB: 1960    Referring Physician: Venus Carroll MD     Chief Complaint   Patient presents with    Osteopenia       History of Present Illness: Carrie Wilson is a 64 y.o. female who is here today for follow up on   History of Present Illness  The patient presents for evaluation of multiple medical concerns.    She experiences morning stiffness lasting approximately 15 minutes, which she believes is influenced by the depth of her sleep. She reports a pain level of 2 out of 10 and occasional hand discomfort, but no swelling. Inactivity seems to exacerbate stiffness in her shoulders and hands. Overexertion at the gym, such as using the bike or treadmill, can cause knee pain, although this is infrequent. She avoids sun exposure and has been using Plaquenil twice daily. Her last eye examination was in February 2024.    She has been experiencing hair thinning, which she attributes to female pattern baldness. She recalls her grandmother also had thin hair. She is currently on minoxidil and finasteride, prescribed by her dermatologist, which prevents her from donating blood.    Her recent lab results showed a decrease in calcium levels. She had been taking a 1200 mg supplement but reduced it to 600 mg due to concerns about exceeding the recommended dose when combined with her multivitamin. She was unable to find a 1000 mg supplement. She maintains an active lifestyle, including walking and using weight-bearing machines at the gym. She also takes vitamin D, two tablets of 2000 IU each, totaling 4000 IU per day. This dosage was increased from 5000 IU due to low levels. Her last vitamin D check was within the past six months.    She occasionally experiences a sensation in her legs that she likens to movement, which she suspects may be  related to her varicose veins. She has consulted a vein specialist who advised her to monitor the condition and seek treatment if it worsens. However, she prefers to avoid invasive procedures.    She has eczema on her thighs and legs that started in January 2024, which she treats with betamethasone cream. She reports no trouble swallowing, ulcers in the nose or mouth, sharp pain in her lung when she breathes, inability to make spit or tears, or hands turning dramatic colors in cold weather.       Result Review :        Results  Laboratory Studies  Creatinine was 0.9. GFR is 71. Calcium is 8.3. Liver functions normal. Blood counts look good. Eosinophils is 5. Vitamin D was 62.2 in October 2023.          Subjective     Allergies   Allergen Reactions    Amoxicillin Rash    Penicillins Rash    Sulfa Antibiotics Nausea Only         Current Outpatient Medications:     azelastine (ASTEPRO) 0.15 % solution nasal spray, spray 2 spray by intranasal route 2 times every day in each nostril, Disp: , Rfl:     betamethasone dipropionate (DIPROLENE) 0.05 % ointment, Apply  topically to the appropriate area as directed Daily., Disp: , Rfl:     betamethasone, augmented, (DIPROLENE) 0.05 % cream, Apply 1 Application topically to the appropriate area as directed 2 (Two) Times a Day., Disp: , Rfl:     Calcium Acetate 667 MG tablet, , Disp: , Rfl:     cholecalciferol (VITAMIN D3) 25 MCG (1000 UT) tablet, Take 1 tablet by mouth Daily., Disp: , Rfl:     citalopram (CeleXA) 20 MG tablet, Take 1 tablet by mouth Daily., Disp: 90 tablet, Rfl: 4    finasteride (PROSCAR) 5 MG tablet, , Disp: , Rfl:     folic acid (FOLVITE) 1 MG tablet, , Disp: , Rfl:     hydroxychloroquine (PLAQUENIL) 200 MG tablet, Take 1 tablet by mouth 2 (Two) Times a Day., Disp: 60 tablet, Rfl: 0    methotrexate 2.5 MG tablet, Take 3 tablets by mouth 1 (One) Time Per Week., Disp: 12 tablet, Rfl: 0    minoxidil (LONITEN) 2.5 MG tablet, , Disp: , Rfl:     tacrolimus (PROTOPIC)  0.1 % ointment, , Disp: , Rfl:     tretinoin (RETIN-A) 0.025 % cream, , Disp: , Rfl:     Past Medical History:   Diagnosis Date    Diarrhea     Eczema     Hand fracture     History of bone density study 02/10/2017    2019-enia--on fosamax    Hx of colonoscopy 2017    normal--May pt had diarrhea--tests normal     IBS (irritable bowel syndrome)     Joint pain     Menopause     Neck pain     Ocular histoplasmosis     Osteopenia     Papanicolaou smear 10/16/2018    2019-neg    Positive LEVAR (antinuclear antibody)     Screening breast examination     Self; Admits    Shingles     Shoulder pain     Varicella child        Past Surgical History:   Procedure Laterality Date    COLONOSCOPY  2017-neg    CYST REMOVAL      from chest    ENDOMETRIAL BIOPSY  2011    benign    TOE SURGERY      WISDOM TOOTH EXTRACTION         Family History   Problem Relation Age of Onset    Hypertension Mother             Osteoporosis Mother     Arthritis Mother     Hyperlipidemia Father         Elevated    Aneurysm Father     Other (Idiopathic hypertrophic subaortic stenosis) Father     Heart disease Brother     Osteoporosis Brother     Kidney disease Brother     Heart disease Maternal Grandfather     Heart disease Paternal Grandfather     Breast cancer Neg Hx     Ovarian cancer Neg Hx     Colon cancer Neg Hx     Uterine cancer Neg Hx         Social History     Socioeconomic History    Marital status:    Tobacco Use    Smoking status: Former     Current packs/day: 0.00     Average packs/day: 1 pack/day for 10.3 years (10.3 ttl pk-yrs)     Types: Cigarettes     Start date: 1976     Quit date: 11/15/1986     Years since quittin.8    Smokeless tobacco: Never   Vaping Use    Vaping status: Never Used   Substance and Sexual Activity    Alcohol use: Not Currently    Drug use: Never    Sexual activity: Not Currently     Partners: Male     Birth control/protection: Post-menopausal        Review of Systems   Constitutional:  Positive for unexpected weight gain.   HENT:  Positive for tinnitus.    Musculoskeletal:  Positive for arthralgias.      I have reviewed and updated the patient's chief complaint, history of present illness, review of systems, past medical history, surgical history, family history, social history, medications and allergy list as appropriate.     Objective    Vital Signs:   There were no vitals filed for this visit.  Carrie Wilson reports a pain score of .  Given her pain assessment as noted, treatment options were discussed and the following options were decided upon as a follow-up plan to address the patient's pain: .      There is no height or weight on file to calculate BMI.        Physical Exam   Physical Exam  Patient is alert, no acute distress.  Head is atraumatic, normocephalic. Pupils are equal and round. Extraocular muscles are intact. Oropharynx is essentially negative except for some mild very slight coating and dryness in the center of the tongue.  Lungs are clear.  Heart is regular without rubs, gallops, or murmurs.  Thoracic and lumbar spine are nontender. Muscles are nontender. Wrist, MCPs, and PIPs are nontender and without synovitis. No synovitis in the knees, ankles, or MTPs.  Skin rash is negative.    Physical Exam  There is currently no information documented on the homunculus. Go to the Rheumatology activity and complete the homunculus joint exam.     Results Review:   Imaging Results (Last 24 Hours)       ** No results found for the last 24 hours. **            Procedures    Assessment / Plan    Assessment/Plan:   There are no diagnoses linked to this encounter.      Assessment & Plan  1. Rheumatoid Arthritis.  Her kidney function appears stable, with no signs or symptoms of lupus. Some lab results could indicate mixed connective tissue disease, but rheumatoid arthritis remains the primary diagnosis. She reports 15 minutes of morning stiffness and a  pain level of 2/10. Continuation of the current medication regimen is advised. She may consider reducing Plaquenil to once daily. Methotrexate labs have been ordered. If she experiences increased achiness, she should revert to the previous dosage and inform the provider.    2. Alopecia.  The hair thinning does not resemble the alopecia associated with lupus. She is advised to consult her dermatologist regarding the potential benefits of IGNACIO inhibitors for hair growth. She is currently using minoxidil and finasteride.    3. Osteopenia.  Her next bone density test is due in fall 2025. Her vitamin D levels have remained stable, with a level of 62.2 in October 2023 and 61.9 the previous year. Continuation of calcium and vitamin D supplements is recommended. She is taking 4000 IU of vitamin D daily. Standing orders for liver, kidney, and blood count tests have been provided. An annual vitamin D test will be scheduled.    4. Varicose Veins.  Vasculera has been suggested as a potential treatment option. She is advised to consider this non-invasive treatment and monitor for any side effects such as diarrhea.    Follow-up  A follow-up appointment is scheduled for 4 months from now.        Follow Up:   No follow-ups on file.       Christiane Hernandez MD  [facility]     Encounter Administratively Closed by Health Information Management

## 2024-11-01 ENCOUNTER — TELEPHONE (OUTPATIENT)
Age: 64
End: 2024-11-01
Payer: COMMERCIAL

## 2024-11-01 NOTE — TELEPHONE ENCOUNTER
Caller: Carrie Wilson    Relationship to patient: Self    Best call back number: [phone]    Patient is needing: PT HAS AN APPT WITH HER PCP TODAY AT 9:40AM AND ASKED IF HER RECENT LAB RESULTS COULD BE FAXED OVER TO THEM, FAX # [phone].

## 2025-01-14 ENCOUNTER — TELEPHONE (OUTPATIENT)
Age: 65
End: 2025-01-14
Payer: COMMERCIAL

## 2025-01-14 NOTE — TELEPHONE ENCOUNTER
PTS APPT TIME HAD TO CHANGE DUE TO PROVIDERS SCHEDULE. PT HAS BEEN MADE AWARE OF NEW APPT TIME.

## 2025-01-23 ENCOUNTER — TELEPHONE (OUTPATIENT)
Age: 65
End: 2025-01-23
Payer: COMMERCIAL

## 2025-01-23 NOTE — TELEPHONE ENCOUNTER
PT APPT HAD TO BE CANCELLED. IF PT CALLS BACK TO RESCHEDULE, PLEASE SCHEDULE WITH TRUDY LINDER OR BHAVESH. WE ARE CURRENTLY UNABLE TO RESCHEDULE ANY PATIENTS WITH DR. CAVANAUGH UNTIL FURTHER NOTICE. PTS WILL NEED TO BE RESCHEDULED WITH TRUDY OR BHAVESH. PT HAS BEEN ADDED TO THE CANCELLATION LIST AS HIGH PRIORITY.   -HUB READY TO SCHEDULE.

## 2025-02-05 NOTE — TELEPHONE ENCOUNTER
Labs in chart from 12/27/24, patient has never seen a provider other than Dr. Hernandez. Now scheduled with TRUDY for 5/20/25. Please send refill.

## 2025-02-06 RX ORDER — METHOTREXATE 2.5 MG/1
TABLET ORAL
Qty: 12 TABLET | Refills: 3 | Status: SHIPPED | OUTPATIENT
Start: 2025-02-06

## 2025-02-06 NOTE — TELEPHONE ENCOUNTER
I have sent refills. Please ensure she has a standing lab order for every 8 weeks. She will be due for labs at the end of this month.

## 2025-02-26 ENCOUNTER — TELEPHONE (OUTPATIENT)
Age: 65
End: 2025-02-26
Payer: COMMERCIAL

## 2025-02-26 DIAGNOSIS — Z79.899 HIGH RISK MEDICATION USE: ICD-10-CM

## 2025-02-26 DIAGNOSIS — M05.79 RHEUMATOID ARTHRITIS INVOLVING MULTIPLE SITES WITH POSITIVE RHEUMATOID FACTOR: Primary | ICD-10-CM

## 2025-02-26 DIAGNOSIS — M05.79 RHEUMATOID ARTHRITIS INVOLVING MULTIPLE SITES WITH POSITIVE RHEUMATOID FACTOR: ICD-10-CM

## 2025-02-26 RX ORDER — FOLIC ACID 1 MG/1
1 TABLET ORAL DAILY
Qty: 90 TABLET | Refills: 1 | Status: SHIPPED | OUTPATIENT
Start: 2025-02-26

## 2025-02-26 NOTE — TELEPHONE ENCOUNTER
Pt needing new lab orders. Previous was in [facility] name. Advise when complete, we will mail to pt

## 2025-02-26 NOTE — TELEPHONE ENCOUNTER
Caller: Carrie Wilson    Relationship: Self    Best call back number: [phone]  What form or medical record are you requesting: LAB ORDERS    Who is requesting this form or medical record from you: SELF    How would you like to receive the form or medical records (pick-up, mail, fax): MAIL  If fax, what is the fax number:   If mail, what is the address: [address] [place] [id number]   If pick-up, provide patient with address and location details    Timeframe paperwork needed: ASAP    Additional notes:

## 2025-02-26 NOTE — TELEPHONE ENCOUNTER
Caller: Carrie Wilson    Relationship: Self    Best call back number: [phone]    Requested Prescriptions:   Requested Prescriptions     Pending Prescriptions Disp Refills    folic acid (FOLVITE) 1 MG tablet 90 tablet 1     Sig: Take 1 tablet by mouth Daily.        Pharmacy where request should be sent: [facility] [id number] [address] [phone] [facility] [phone] FX     Last office visit with prescribing clinician: Visit date not found   Last telemedicine visit with prescribing clinician: Visit date not found   Next office visit with prescribing clinician: 5/20/2025     Additional details provided by patient:     Does the patient have less than a 3 day supply:  [] Yes  [x] No    Would you like a call back once the refill request has been completed: [] Yes [x] No    If the office needs to give you a call back, can they leave a voicemail: [] Yes [x] No    Kallie Mccain Rep   02/26/25 10:39 EST

## 2025-04-01 ENCOUNTER — OFFICE VISIT (OUTPATIENT)
Dept: OBSTETRICS AND GYNECOLOGY | Facility: CLINIC | Age: 65
End: 2025-04-01
Payer: COMMERCIAL

## 2025-04-01 VITALS
BODY MASS INDEX: 32.78 KG/M2 | HEIGHT: 66 IN | DIASTOLIC BLOOD PRESSURE: 86 MMHG | WEIGHT: 204 LBS | SYSTOLIC BLOOD PRESSURE: 130 MMHG

## 2025-04-01 DIAGNOSIS — M85.80 OSTEOPENIA AFTER MENOPAUSE: ICD-10-CM

## 2025-04-01 DIAGNOSIS — Z78.0 POSTMENOPAUSAL STATUS: ICD-10-CM

## 2025-04-01 DIAGNOSIS — Z12.39 ENCOUNTER FOR BREAST CANCER SCREENING USING NON-MAMMOGRAM MODALITY: ICD-10-CM

## 2025-04-01 DIAGNOSIS — Z78.0 OSTEOPENIA AFTER MENOPAUSE: ICD-10-CM

## 2025-04-01 DIAGNOSIS — Z01.419 WOMEN'S ANNUAL ROUTINE GYNECOLOGICAL EXAMINATION: Primary | ICD-10-CM

## 2025-04-01 PROBLEM — N95.1 MENOPAUSAL SYMPTOMS: Status: RESOLVED | Noted: 2020-12-11 | Resolved: 2025-04-01

## 2025-04-01 NOTE — PROGRESS NOTES
Gynecologic Annual Exam Note        GYN Annual Exam     CC - Here for annual exam.        HPI  Carrie Wilson is a 64 y.o. female, , who presents for annual well woman exam as a established patient.  She is postmenopausal.. Denies vaginal bleeding.   Since her last visit the patient was diagnosed with Rheumatoid Arthritis  Marital Status: .  She is not currently sexually active. STD testing recommendations have been explained to the patient and she declines STD testing.    Patient participates in the ovarian cancer screening at  annually.    The patient would like to discuss the following complaints today: none    Additional OB/GYN History   On HRT? No    Last Pap : 23. Results: negative. HPV: negative.   Last Completed Pap Smear            Upcoming       PAP SMEAR (Every 3 Years) Next due on 1/3/2026      2023  LIQUID-BASED PAP SMEAR, [facility] ([facility])    2021  SCANNED - PAP SMEAR    2020  Pap IG, Rfx HPV ASCU    2019  Done - Negative                          History of abnormal Pap smear: no  Family history of uterine, colon, breast, or ovarian cancer: no  Performs monthly Self-Breast Exam: occasional  Last mammogram: 24. Done at . There is a copy in the chart.    Last Completed Mammogram            Awaiting Completion       MAMMOGRAM (Every 2 Years) Order placed this encounter      2025  Order placed for Mammo Screening Digital Tomosynthesis Bilateral With CAD by Yudelka Roman MD    2024  Mammo Screening Digital Tomosynthesis Bilateral With CAD    2023  Mammo Screening Digital Tomosynthesis Bilateral With CAD    2022  Mammo Screening Digital Tomosynthesis Bilateral With CAD    2021  Mammo Screening Digital Tomosynthesis Bilateral With CAD     Only the first 5 history entries have been loaded, but more history exists.                        Last colonoscopy: has had a colonoscopy 3 years ago    Last Completed  Colonoscopy            Needs Review       COLORECTAL CANCER SCREENING (COLONOSCOPY - Every 10 Years) Tentatively due on 2022  Outside Procedure: COLONOSCOPY    2022  Outside Procedure: COLONOSCOPY                            Her last bone density scan was 6 months ago and results were Osteopenia  Exercises Regularly: yes  Feelings of Anxiety or Depression: no      Tobacco Usage?: No       Current Outpatient Medications:     betamethasone dipropionate (DIPROLENE) 0.05 % ointment, Apply  topically to the appropriate area as directed Daily., Disp: , Rfl:     betamethasone, augmented, (DIPROLENE) 0.05 % cream, Apply 1 Application topically to the appropriate area as directed 2 (Two) Times a Day., Disp: , Rfl:     Calcium Acetate 667 MG tablet, , Disp: , Rfl:     cholecalciferol (VITAMIN D3) 25 MCG (1000 UT) tablet, Take 4 tablets by mouth Daily., Disp: , Rfl:     citalopram (CeleXA) 20 MG tablet, Take 1 tablet by mouth Daily., Disp: 90 tablet, Rfl: 4    finasteride (PROSCAR) 5 MG tablet, , Disp: , Rfl:     folic acid (FOLVITE) 1 MG tablet, Take 1 tablet by mouth Daily., Disp: 90 tablet, Rfl: 1    hydroxychloroquine (PLAQUENIL) 200 MG tablet, Take 1 tablet by mouth 2 (Two) Times a Day. (Patient taking differently: Take 1 tablet by mouth 2 (Two) Times a Day. Once a day), Disp: 60 tablet, Rfl: 4    methotrexate 2.5 MG tablet, TAKE 3 TABLETS BY MOUTH ONCE WEEKLY, Disp: 12 tablet, Rfl: 3    minoxidil (LONITEN) 2.5 MG tablet, , Disp: , Rfl:     tacrolimus (PROTOPIC) 0.1 % ointment, , Disp: , Rfl:     azelastine (ASTEPRO) 0.15 % solution nasal spray, spray 2 spray by intranasal route 2 times every day in each nostril (Patient not taking: Reported on 2025), Disp: , Rfl:     tretinoin (RETIN-A) 0.025 % cream, , Disp: , Rfl:     Patient denies the need for medication refills today.    OB History          2    Para   2    Term   2            AB        Living   2         SAB        IAB         Ectopic        Molar        Multiple        Live Births   2                Past Medical History:   Diagnosis Date    Diarrhea     Eczema     Hand fracture     History of bone density study 02/10/2017    11/12/2019-[place]--on fosamax    Hx of colonoscopy 02/01/2017    normal--May pt had diarrhea--tests normal     IBS (irritable bowel syndrome)     Joint pain     Menopause     Neck pain     Ocular histoplasmosis     Osteopenia 2017    okay now    Papanicolaou smear 10/16/2018    11/12/2019-neg    Positive LEVAR (antinuclear antibody)     Rheumatoid arthritis     Screening breast examination     Self; Admits    Shingles     Shoulder pain     Varicella child        Past Surgical History:   Procedure Laterality Date    COLONOSCOPY  02/01/2017 11/12/2019-neg    CYST REMOVAL      from chest    ENDOMETRIAL BIOPSY  01/03/2011    benign    TOE SURGERY      WISDOM TOOTH EXTRACTION         Health Maintenance   Topic Date Due    TDAP/TD VACCINES (1 - Tdap) Never done    Pneumococcal Vaccine 50+ (1 of 1 - PCV) Never done    HEPATITIS C SCREENING  Never done    ANNUAL PHYSICAL  Never done    Annual Gynecologic Pelvic and Breast Exam  03/23/2025    INFLUENZA VACCINE  07/01/2025    PAP SMEAR  01/03/2026    MAMMOGRAM  07/01/2026    COLORECTAL CANCER SCREENING  04/29/2032    COVID-19 Vaccine  Completed    ZOSTER VACCINE  Completed       The additional following portions of the patient's history were reviewed and updated as appropriate: allergies, current medications, past family history, past medical history, past social history, past surgical history, and problem list.    Review of Systems   Constitutional: Negative.    HENT: Negative.     Eyes: Negative.    Respiratory: Negative.     Cardiovascular: Negative.    Gastrointestinal: Negative.    Endocrine: Negative.    Genitourinary: Negative.    Musculoskeletal: Negative.    Skin: Negative.    Allergic/Immunologic: Negative.    Neurological: Negative.    Hematological: Negative.  "   Psychiatric/Behavioral: Negative.         I have reviewed and agree with the HPI, ROS, and historical information as entered above. Yudelka Roman MD      Objective   /86   Ht 167.6 cm (66\")   Wt 92.5 kg (204 lb)   BMI 32.93 kg/m²     Physical Exam  Vitals and nursing note reviewed. Exam conducted with a chaperone present.   Constitutional:       Appearance: She is well-developed.   HENT:      Head: Normocephalic and atraumatic.   Neck:      Thyroid: No thyroid mass or thyromegaly.   Cardiovascular:      Rate and Rhythm: Normal rate and regular rhythm.      Heart sounds: No murmur heard.  Pulmonary:      Effort: Pulmonary effort is normal. No retractions.      Breath sounds: Normal breath sounds. No wheezing, rhonchi or rales.   Chest:      Chest wall: No mass or tenderness.   Breasts:     Right: Normal. No mass, nipple discharge, skin change or tenderness.      Left: Normal. No mass, nipple discharge, skin change or tenderness.   Abdominal:      General: Bowel sounds are normal.      Palpations: Abdomen is soft. Abdomen is not rigid. There is no mass.      Tenderness: There is no abdominal tenderness. There is no guarding.      Hernia: No hernia is present. There is no hernia in the left inguinal area.   Genitourinary:     Labia:         Right: No rash, tenderness or lesion.         Left: No rash, tenderness or lesion.       Vagina: Normal. No vaginal discharge or lesions.      Cervix: No cervical motion tenderness, discharge, lesion or cervical bleeding.      Uterus: Normal. Not enlarged, not fixed and not tender.       Adnexa:         Right: No mass or tenderness.          Left: No mass or tenderness.        Rectum: No external hemorrhoid.   Musculoskeletal:      Cervical back: Normal range of motion. No muscular tenderness.   Neurological:      Mental Status: She is alert and oriented to person, place, and time.   Psychiatric:         Behavior: Behavior normal.            Assessment and " Plan    Encounter Diagnoses   Name Primary?    Women's annual routine gynecological examination Yes    Encounter for breast cancer screening using non-mammogram modality     Osteopenia after menopause     Postmenopausal status          GYN annual well woman exam.   Recommended use of Vitamin D replacement and getting adequate calcium in her diet. (1500mg)  Continue yearly mammography.  Reviewed self breast awareness.  Instructed to call with lumps, pain, or breast discharge.    Patient reports that she is not currently experiencing any symptoms of urinary incontinence.  Up to date on colon cancer screening.    Reviewed HPV guidelines.  Reviewed exercise as a preventative health measures.   Return in about 1 year (around 4/1/2026).         Yudelka Roman MD  04/01/2025

## 2025-05-05 ENCOUNTER — RESULTS FOLLOW-UP (OUTPATIENT)
Age: 65
End: 2025-05-05
Payer: COMMERCIAL

## 2025-05-08 ENCOUNTER — TELEPHONE (OUTPATIENT)
Age: 65
End: 2025-05-08
Payer: COMMERCIAL

## 2025-05-08 NOTE — TELEPHONE ENCOUNTER
Caller: Carrie Wilson    Relationship to patient: Self    Best call back number: [phone]    Patient is needing: PT HAS AN APPT COMING UP WITH HER PCP AND NEEDS US TO FAX THEM HER RECENT LAB RESULTS. THEIR FAX # [phone].

## 2025-05-15 NOTE — PROGRESS NOTES
[facility] [place] [facility] Follow Up Visit     Office Follow Up      Date: 05/20/2025   Patient Name: Carrie Wilson  MRN: [id number]  YOB: 1960    Referring Physician: No ref. provider found     Chief Complaint   Patient presents with    Joint Pain       History of Present Illness: Carrie Wilson is a 64 y.o. female who is here today for follow up on   History of Present Illness  The patient presents for evaluation of multiple medical concerns.    She experiences morning stiffness lasting approximately 15 minutes, which she believes is influenced by the depth of her sleep. She reports a pain level of 2 out of 10 and occasional hand discomfort, but no swelling. Inactivity seems to exacerbate stiffness in her shoulders and hands. Overexertion at the gym, such as using the bike or treadmill, can cause knee pain, although this is infrequent. She avoids sun exposure and has been using Plaquenil twice daily. Her last eye examination was in February 2024.    She has been experiencing hair thinning, which she attributes to female pattern baldness. She recalls her grandmother also had thin hair. She is currently on minoxidil and finasteride, prescribed by her dermatologist, which prevents her from donating blood.    Her recent lab results showed a decrease in calcium levels. She had been taking a 1200 mg supplement but reduced it to 600 mg due to concerns about exceeding the recommended dose when combined with her multivitamin. She was unable to find a 1000 mg supplement. She maintains an active lifestyle, including walking and using weight-bearing machines at the gym. She also takes vitamin D, two tablets of 2000 IU each, totaling 4000 IU per day. This dosage was increased from 5000 IU due to low levels. Her last vitamin D check was within the past six months.    She occasionally experiences a sensation in her legs that she likens to movement, which she suspects may be  related to her varicose veins. She has consulted a vein specialist who advised her to monitor the condition and seek treatment if it worsens. However, she prefers to avoid invasive procedures.    She has eczema on her thighs and legs that started in January 2024, which she treats with betamethasone cream. She reports no trouble swallowing, ulcers in the nose or mouth, sharp pain in her lung when she breathes, inability to make spit or tears, or hands turning dramatic colors in cold weather.     Interim 5/20/25: Today patient reports feeling the same.  She rates her pain 1/10, global 2/10 and 15 minutes of morning stiffness.  She would like refills of hydroxychloriquine, methotrexate and folic acid.       Subjective     Allergies   Allergen Reactions    Amoxicillin Rash    Penicillins Rash    Salicylates Rash    Sulfa Antibiotics Nausea Only         Current Outpatient Medications:     betamethasone dipropionate (DIPROLENE) 0.05 % ointment, Apply  topically to the appropriate area as directed Daily., Disp: , Rfl:     Calcium Acetate 667 MG tablet, , Disp: , Rfl:     cholecalciferol (VITAMIN D3) 25 MCG (1000 UT) tablet, Take 4 tablets by mouth Daily., Disp: , Rfl:     citalopram (CeleXA) 20 MG tablet, Take 1 tablet by mouth Daily., Disp: 90 tablet, Rfl: 4    finasteride (PROSCAR) 5 MG tablet, , Disp: , Rfl:     fluocinonide (LIDEX) 0.05 % cream, Apply 1 Application topically to the appropriate area as directed 2 (Two) Times a Day., Disp: , Rfl:     folic acid (FOLVITE) 1 MG tablet, Take 1 tablet by mouth Daily., Disp: 90 tablet, Rfl: 1    hydroxychloroquine (PLAQUENIL) 200 MG tablet, Take 1 tablet by mouth Daily., Disp: 90 tablet, Rfl: 1    methotrexate 2.5 MG tablet, Take 3 tablets by mouth 1 (One) Time Per Week., Disp: 12 tablet, Rfl: 3    minoxidil (LONITEN) 2.5 MG tablet, , Disp: , Rfl:     tacrolimus (PROTOPIC) 0.1 % ointment, , Disp: , Rfl:     tretinoin (RETIN-A) 0.025 % cream, , Disp: , Rfl:     Past Medical  History:   Diagnosis Date    Diarrhea     Eczema     Hand fracture     History of bone density study 02/10/2017    2019-enia--on fosamax    Hx of colonoscopy 2017    normal--May pt had diarrhea--tests normal     IBS (irritable bowel syndrome)     Joint pain     Menopause     Neck pain     Ocular histoplasmosis     Osteopenia     okay now    Papanicolaou smear 10/16/2018    2019-neg    Positive LEVAR (antinuclear antibody)     Rheumatoid arthritis     Screening breast examination     Self; Admits    Shingles     Shoulder pain     Varicella child        Past Surgical History:   Procedure Laterality Date    COLONOSCOPY  2017-neg    CYST REMOVAL      from chest    ENDOMETRIAL BIOPSY  2011    benign    TOE SURGERY      WISDOM TOOTH EXTRACTION         Family History   Problem Relation Age of Onset    Hypertension Mother             Osteoporosis Mother             Arthritis Mother     Hyperlipidemia Father         Elevated    Aneurysm Father     Other (Idiopathic hypertrophic subaortic stenosis) Father     Heart disease Brother     Osteoporosis Brother     Kidney disease Brother     Heart disease Maternal Grandfather     Heart disease Paternal Grandfather     Breast cancer Neg Hx     Ovarian cancer Neg Hx     Colon cancer Neg Hx     Uterine cancer Neg Hx         Social History     Socioeconomic History    Marital status:    Tobacco Use    Smoking status: Former     Current packs/day: 0.00     Average packs/day: 1 pack/day for 10.3 years (10.3 ttl pk-yrs)     Types: Cigarettes     Start date: 1976     Quit date: 11/15/1986     Years since quittin.5     Passive exposure: Past    Smokeless tobacco: Never   Vaping Use    Vaping status: Never Used   Substance and Sexual Activity    Alcohol use: Not Currently    Drug use: Never    Sexual activity: Not Currently     Partners: Male     Birth control/protection: Post-menopausal       Review of Systems  "  HENT:  Positive for congestion and dental problem.    Musculoskeletal:  Positive for arthralgias.   Skin:  Positive for rash.   Allergic/Immunologic: Positive for environmental allergies and immunocompromised state.   Neurological:  Positive for dizziness and memory problem.   Hematological:  Bruises/bleeds easily.   All other systems reviewed and are negative.       I have reviewed and updated the patient's chief complaint, history of present illness, review of systems, past medical history, surgical history, family history, social history, medications and allergy list as appropriate.     Objective    Vital Signs:   Vitals:    05/20/25 0846   BP: 128/84   BP Location: Left arm   Patient Position: Sitting   Cuff Size: Adult   Pulse: 82   Temp: 97.2 °F (36.2 °C)   Weight: 91.6 kg (202 lb)   Height: 167.6 cm (66\")   PainSc: 1    PainLoc: Generalized     Carrie Wilson reports a pain score of 1.  Given her pain assessment as noted, treatment options were discussed and the following options were decided upon as a follow-up plan to address the patient's pain: .      Body mass index is 32.6 kg/m².        Physical Exam  Vitals reviewed.   Constitutional:       Appearance: Normal appearance.   HENT:      Head: Normocephalic and atraumatic.      Mouth/Throat:      Mouth: Mucous membranes are moist.   Eyes:      Conjunctiva/sclera: Conjunctivae normal.   Cardiovascular:      Rate and Rhythm: Normal rate and regular rhythm.      Pulses: Normal pulses.      Heart sounds: Normal heart sounds.   Pulmonary:      Effort: Pulmonary effort is normal.      Breath sounds: Normal breath sounds.   Musculoskeletal:         General: Normal range of motion.      Cervical back: Normal range of motion and neck supple.      Comments: No synovitis  [place] bilaterally  Crepitus bilateral knees   Skin:     General: Skin is warm and dry.   Neurological:      General: No focal deficit present.      Mental Status: She is alert and oriented to " person, place, and time. Mental status is at baseline.   Psychiatric:         Mood and Affect: Mood normal.         Behavior: Behavior normal.         Thought Content: Thought content normal.         Judgment: Judgment normal.          Physical Exam      Results Review:   Imaging Results (Last 24 Hours)       ** No results found for the last 24 hours. **            Procedures    Assessment / Plan        Assessment & Plan  Rheumatoid arthritis involving multiple sites with positive rheumatoid factor  Her kidney function appears stable, with no signs or symptoms of lupus. Some lab results could indicate mixed connective tissue disease, but rheumatoid arthritis remains the primary diagnosis.    Continuation of the current medication regimen is advised.   Continue Plaquenil once a day.  She does not feel worse on one a day.    Hair loss  She is currently using minoxidil and finasteride.  Hair is growing faster.  She does have receding in the front.       Osteopenia, unspecified location  Her next bone density test is due in fall 2025.     Continuation of calcium and vitamin D supplements is recommended. She is taking 4000 IU of vitamin D daily.   Standing orders for liver, kidney, and blood count tests have been provided.    High risk medication use  Methotrexate and Plaquenil    Labs every 8 weeks    We discussed the side effects of hydroxychloroquine including but not limited to GI upset, rash, photosensitivity, Hematologic and liver abnormalities and ocular abnormalities and need for frequent eye exam for toxicity monitoring    Risks of methotrexate include but are not limited to severe liver damage that can be fatal, the possible need for liver biopsy, bone marrow suppression that can lead to dangerously low blood counts, GI side effects including mouth sores and diarrhea, fatigue, and rare risk of severe pulmonary complications. There should be no alcohol consumed with MTX. MTX can cause severe fetal abnormalities  whether the mother or father is taking the medication and thus must be avoided if pregnancy is a possibility.  All medication is to be taken one day a week only. The need for q 8-12 week labs and the need for folic acid supplementation were discussed.                     Follow Up:   Return in about 4 months (around 9/20/2025) for Dr. Clemente.       LAZARO Goodman  [facility] [place]

## 2025-05-20 ENCOUNTER — OFFICE VISIT (OUTPATIENT)
Age: 65
End: 2025-05-20
Payer: COMMERCIAL

## 2025-05-20 ENCOUNTER — TELEPHONE (OUTPATIENT)
Age: 65
End: 2025-05-20

## 2025-05-20 VITALS
TEMPERATURE: 97.2 F | WEIGHT: 202 LBS | HEART RATE: 82 BPM | SYSTOLIC BLOOD PRESSURE: 128 MMHG | HEIGHT: 66 IN | DIASTOLIC BLOOD PRESSURE: 84 MMHG | BODY MASS INDEX: 32.47 KG/M2

## 2025-05-20 DIAGNOSIS — M85.80 OSTEOPENIA, UNSPECIFIED LOCATION: ICD-10-CM

## 2025-05-20 DIAGNOSIS — L65.9 HAIR LOSS: ICD-10-CM

## 2025-05-20 DIAGNOSIS — M05.79 RHEUMATOID ARTHRITIS INVOLVING MULTIPLE SITES WITH POSITIVE RHEUMATOID FACTOR: Primary | ICD-10-CM

## 2025-05-20 DIAGNOSIS — Z79.899 HIGH RISK MEDICATION USE: ICD-10-CM

## 2025-05-20 PROCEDURE — 99214 OFFICE O/P EST MOD 30 MIN: CPT | Performed by: NURSE PRACTITIONER

## 2025-05-20 RX ORDER — METHOTREXATE 2.5 MG/1
7.5 TABLET ORAL WEEKLY
Qty: 12 TABLET | Refills: 3 | Status: SHIPPED | OUTPATIENT
Start: 2025-05-20

## 2025-05-20 RX ORDER — HYDROXYCHLOROQUINE SULFATE 200 MG/1
200 TABLET, FILM COATED ORAL DAILY
Qty: 90 TABLET | Refills: 1 | Status: SHIPPED | OUTPATIENT
Start: 2025-05-20

## 2025-05-20 RX ORDER — FLUOCINONIDE 0.5 MG/G
1 CREAM TOPICAL 2 TIMES DAILY
COMMUNITY

## 2025-05-20 RX ORDER — FOLIC ACID 1 MG/1
1 TABLET ORAL DAILY
Qty: 90 TABLET | Refills: 1 | Status: SHIPPED | OUTPATIENT
Start: 2025-05-20

## 2025-05-21 ENCOUNTER — RESULTS FOLLOW-UP (OUTPATIENT)
Age: 65
End: 2025-05-21
Payer: COMMERCIAL

## 2025-06-12 DIAGNOSIS — N95.1 MENOPAUSAL SYMPTOMS: ICD-10-CM

## 2025-06-12 RX ORDER — CITALOPRAM HYDROBROMIDE 20 MG/1
20 TABLET ORAL DAILY
Qty: 90 TABLET | Refills: 4 | Status: SHIPPED | OUTPATIENT
Start: 2025-06-12

## 2025-06-28 LAB
NCCN CRITERIA FLAG: NORMAL
TYRER CUZICK SCORE: 8.4

## 2025-07-03 ENCOUNTER — HOSPITAL ENCOUNTER (OUTPATIENT)
Dept: MAMMOGRAPHY | Facility: HOSPITAL | Age: 65
Discharge: HOME | End: 2025-07-03
Admitting: OBSTETRICS & GYNECOLOGY
Payer: COMMERCIAL

## 2025-07-03 DIAGNOSIS — Z12.39 ENCOUNTER FOR BREAST CANCER SCREENING USING NON-MAMMOGRAM MODALITY: ICD-10-CM

## 2025-07-03 PROCEDURE — 77067 SCR MAMMO BI INCL CAD: CPT

## 2025-07-03 PROCEDURE — 77063 BREAST TOMOSYNTHESIS BI: CPT
